# Patient Record
(demographics unavailable — no encounter records)

---

## 2019-12-12 NOTE — RAD
3 view study of the left hand

 

Clinical indications: Left hand pain. Injury last night. Pain in the third

and fifth digits.

 

FINDINGS: No acute fracture or dislocation or lytic process is evident. No

erosive arthritic change is evident. No radiopaque foreign body is 

evident.

 

IMPRESSION: No acute osseous abnormality.

 

Electronically signed by: David Dai MD (12/12/2019 6:13 PM) 

[id number]

## 2020-11-19 NOTE — RAD
Examination: CT CHEST ABD PELVIS W/CONTRAST

 

History:  left rib pain/luq abd pain, s/p fall  

 

Comparison/Correlation: None

 

Findings: Axial images of the chest, abdomen, and pelvis were obtained 

following IV contrast. Sagittal and coronal reformatted images were 

provided.

 

Axial images of the chest, abdomen, and pelvis were obtained following IV 

contrast. Sagittal and coronal reformatted images were provided.

 

Minimal bullous involvement of the lung apices noted. No infiltrate. No 

suspicious pulmonary nodule or mass. No pneumothorax. No enlarged nodes. 

Thoracic aorta is unremarkable. Bony thorax is unremarkable.

 

Liver, spleen, pancreas, adrenal glands, and kidneys are normal. The 

gallbladder fossa is unremarkable. Appendix is normal. No bowel 

obstruction or inflammatory findings. No extraluminal gas. Urinary bladder

is unremarkable.

 

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 

developmental. No acute bony process.

 

 

Impression:

No rib fracture or abdominal organ laceration.

 

No infiltrate.

 

 

PQRS Compliance Statement:

 

One or more of the following individualized dose reduction techniques were

utilized for this examination:  

1. Automated exposure control  

2. Adjustment of the mA and/or kV according to patient size  

3. Use of iterative reconstruction technique

 

Electronically signed by: Rashel Joyner MD (11/19/2020 4:44 PM) [facility]

## 2020-11-19 NOTE — PHYS DOC
Past History


Past Medical History:  Fibromyalgia, Hypertension, Other


Past Surgical History:  Other


Alcohol Use:  Heavy


Drug Use:  None





General Adult


EDM:


Chief Complaint:  RIB PAIN





HPI:


HPI:


40 yo M PMH HTN and fibromyalgia presents to the ed with c/o left lower anterior

and posterior rib pain after accidental slip/fall on wet bathroom floor, fell 

backwards and hit his back/landed on the shower tub. No head injury or LOC. On 

no AC. Was not intoxicated at the time of injury. Pain worsens with deep 

respirations.





Review of Systems:


Review of Systems:


Constitutional:  Denies fever or chills 


Eyes:  Denies change in visual acuity 


HENT:  Denies nasal congestion or sore throat 


Respiratory:  Denies cough or hemoptysis


Cardiovascular:  Denies chest pain or edema 


GI:  Denies nausea, vomiting, bloody stools or diarrhea 


: Denies dysuria 


Musculoskeletal:  Denies joint pain or swelling


Integument:  Denies rash 


Neurologic:  Denies headache, midline neck pain, focal weakness or sensory 

changes 


Endocrine:  Denies polyuria or polydipsia 


Lymphatic:  Denies swollen glands 


Psychiatric:  Denies depression or anxiety





Current Medications:


Current Meds:





Current Medications








 Medications


  (Trade)  Dose


 Ordered  Sig/Jamie  Start Time


 Stop Time Status Last Admin


Dose Admin


 


 Hydromorphone HCl


  (Dilaudid)  0.5 mg  1X  ONCE  20 15:15


 20 15:16   





 


 Info


  (Do NOT chart on


 this entry -- for


 MONITORING)  1 each  PRN DAILY  PRN  20 14:45


 20 14:44   





 


 Iohexol


  (Omnipaque 300


 Mg/ml)  75 ml  1X  ONCE  20 14:15


 20 14:30 DC  





 


 Lidocaine


  (Lidoderm)  1 patch  DAILY  20 14:30


     





 


 Miscellaneous


  (Lidoderm Patch


 Removal)  1 ea  QHS  20 21:00


     














Allergies:


Allergies:





Allergies








Coded Allergies Type Severity Reaction Last Updated Verified


 


  codeine Allergy Intermediate  20 Yes


 


  prochlorperazine Allergy Intermediate  20 Yes











Physical Exam:


PE:





Constitutional: ambulates to ed room but uncomfortable in stretcher, c/o severe 

pain with very light touch-repeat exams not consistent with same location of 

pain each time


HENT: Normocephalic, atraumatic, no midline neck pain


Eyes: PERRLA, EOMI, conjunctiva normal, no discharge. [] 


Neck: Normal range of motion, no tenderness, supple, no stridor. [] 


Cardiovascular:Heart rate regular rhythm, no murmur []


Lungs & Thorax:  Bilateral breath sounds clear to auscultation [] left anterior 

posterior rib tenderness with no flail chest or crepitus


Abdomen: Bowel sounds normal, soft, pain over left lumbar back and left upper 

quadrant (later with no abdominal or back pain)


Skin: Warm, dry, no erythema, no rash. [] 


Back: No midline tenderness, no saddle anesthesia


Extremities: No tenderness, no cyanosis, no clubbing, ROM intact, no edema. [] 


Neurologic: Alert and oriented X 3, normal motor function, normal sensory 

function, no focal deficits noted. []


Psychologic: Affect normal, judgement normal, mood normal. []


Nexus C-spine criteria are negative: There is no post midline tenderness, the 

patient is not intoxicated, there is a normal level of alertness, there are no 

focal neurologic deficits and there are no distracting injuries.





Current Patient Data:


Vital Signs:





                                   Vital Signs








  Date Time  Temp Pulse Resp B/P (MAP) Pulse Ox O2 Delivery O2 Flow Rate FiO2


 


20 14:51   24  99   


 


20 13:40 97.2 78  124/64 (84)    











EKG:


EKG:


[]





Radiology/Procedures:


Radiology/Procedures:


                                 IMAGING REPORT





                                     Signed





PATIENT: HAYLEE LUND     ACCOUNT: [id number]     MRN#: [id number]


: 1979           LOCATION: ER              AGE: 41


SEX: M                    EXAM DT: 20         ACCESSION#: [id number]


STATUS: REG ER            ORD. PHYSICIAN: SAVANNAH MARTE DO


REASON: left rib pain/luq abd pain, s/p fall


PROCEDURE: CT CHEST ABD PELVIS W/CONTRAST





Examination: CT CHEST ABD PELVIS W/CONTRAST


 


History:  left rib pain/luq abd pain, s/p fall  


 


Comparison/Correlation: None


 


Findings: Axial images of the chest, abdomen, and pelvis were obtained 


following IV contrast. Sagittal and coronal reformatted images were 


provided.


 


Axial images of the chest, abdomen, and pelvis were obtained following IV 


contrast. Sagittal and coronal reformatted images were provided.


 


Minimal bullous involvement of the lung apices noted. No infiltrate. No 


suspicious pulmonary nodule or mass. No pneumothorax. No enlarged nodes. 


Thoracic aorta is unremarkable. Bony thorax is unremarkable.


 


Liver, spleen, pancreas, adrenal glands, and kidneys are normal. The 


gallbladder fossa is unremarkable. Appendix is normal. No bowel 


obstruction or inflammatory findings. No extraluminal gas. Urinary bladder


is unremarkable.


 


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 


developmental. No acute bony process.


 


 


Impression:


No rib fracture or abdominal organ laceration.


 


No infiltrate.


 


 


PQRS Compliance Statement:


 


One or more of the following individualized dose reduction techniques were


utilized for this examination:  


1. Automated exposure control  


2. Adjustment of the mA and/or kV according to patient size  


3. Use of iterative reconstruction technique


 


Electronically signed by: Rashel Collins MD (2020 4:44 PM) [facility]














DICTATED AND SIGNED BY:     RASHEL COLLINS MD


DATE:     20 0812





CC: [facility]; [facility] ~[facility]TH0 0





Heart Score:


Risk Factors:


Risk Factors:  DM, Current or recent (<one month) smoker, HTN, HLP, family 

history of CAD, obesity.


Risk Scores:


Score 0 - 3:  2.5% MACE over next 6 weeks - Discharge Home


Score 4 - 6:  20.3% MACE over next 6 weeks - Admit for Clinical Observation


Score 7 - 10:  72.7% MACE over next 6 weeks - Early Invasive Strategies





Course & Med Decision Making:


Course & Med Decision Making


Pertinent Labs and Imaging studies reviewed. (See chart for details)





Concern for musculoskeletal injury, likely spasms.   Patient reports demerol is 

only medication that helps for his pain (after 1mg dilaudid), morphine is not 

enough (when he had surgery on his right hand which has no scars).  Denies being

 on any outpatient narcotic medications. CT imaging negative for any traumatic 

injury. Normal aorta diameter on CT imaging. Will DC home with muscle relaxers 

and Lidoderm patch.  Strict ED return precautions were given for neurologic 

deficits, saddle anesthesia, syncope or chest pain. Encouraged urgent outpatient

 follow-up with PMD.  Life-threatening processes were considered but are low 

suspicion at this time, given history and physical exam. Pt was educated on all 

prescription medications and adverse effects.  All patient's questions were 

answered and pt was stable at time of discharge.





Life/limb-threatening differential includes but is not limited to, intracranial 

hemorrhage, diffuse axonal injury, spinal cord syndrome, unstable cervical 

fracture or SCIWORA, fractures or joint dislocations, neurovascular injuries, 

organ injury or laceration, pneumothorax, pneumoperitoneum, pericardial 

tamponade, unstable pelvic fracture, compartment syndrome, flail chest or 

respiratory distress, burn injury or asphyxiation





I spoken with the patient and her caregivers.  I explained the patient's 

condition, diagnoses and treatment plan based on the information available to me

 at this time.  I have answered the patient and her caregiver's questions and 

addressed any concerns.  The patient and her caregivers have a good 

understanding of patient's diagnosis, condition and treatment plan as can be 

expected at this point.  Vital signs have been stable.  Patient's condition is 

stable and appropriate for discharge from the emergency department. 





Patient will pursue further outpatient evaluation with primary care physician or

 other designated or consulting physician as outlined in the discharge 

instructions.  The patient and/or caregivers are agreeable to this plan of care 

and follow-up instructions have been explained in detail.  The patient and/or 

caregivers have received these instructions in written form and have expressed 

an understanding of the discharge instructions.  The patient and/or caregivers 

are aware that any significant change of condition or worsening of symptoms 

should prompt immediate return to this or the closest emergency department or 

call to [phone].





Maryam Disclaimer:


Dragon Disclaimer:


This electronic medical record was generated, in whole or in part, using a voice

 recognition dictation system.





Departure


Departure:


Impression:  


   Primary Impression:  


   Rib pain on left side


   Additional Impressions:  


   Contusion


   Muscle spasm of back


Disposition:  01 DC HOME SELF CARE/HOMELESS


Condition:  STABLE


Referrals:  


IRAM AL (PCP)


Patient Instructions:  Muscle Strain, Rib Contusion





Additional Instructions:  


EMERGENCY DEPARTMENT GENERAL DISCHARGE INSTRUCTIONS





Thank you for coming to [facility] (ED) today and trusting us

 with you 


care.  We trust that you had a positivie experience in our Emergency Department.

  If you 


wish to speak to the department management, you may call the director at 

[phone].





YOUR FOLLOW UP INSTRUCTIONS ARE AS FOLLOWS:





1.  Do you have a private Doctor?  If you do not have a private doctor, please 

ask for a 


resource list of physicians or clinics that may be able to assist you with 

follow up care.





2.  The Emergency Physician has interpreted your x-rays.  The X-Ray specialist 

will also 


review them.  If there is a change in the findings, you will be notified in 48 

hours when at 


all possible.





3.  A lab test or culture has been done, your results will be reviewed and you 

will be 


notified if you need a change in treatment.





ADDITIONAL INSTRUCTIONS AND INFORMATION:





1.  Your care today has been supervised by a physician who is specially trained 

in emergency 


care.  Many problems require more than one evaluation for a complete diagnosis 

and 


treatment.  We recommend that you schedule your follow up appointment as 

recommended to 


ensure complete treatment of you illness or injury.  If you are unable to obtain

 follow up 


care and continue to have a problem, or if your condition worsens, we recommend 

that you 


return to the ED.





2.  We are not able to safely determine your condition over the phone nor are we

 able to 


give sound medical advice over the phone.  For these safety reasons, if you call

 for medical 


advice we will ask you to come to the ED for further evaluation.





3.  If you have any questions regarding these discharge instructions please call

 the ED at 


[phone].





SAFETY INFORMATION:





In the interest of safety, wellness, and injury prevention; we encourage you to 

wear your 


sealbelt, if you smoke; quite smoking, and we encourage family to use a 

protective helmet 


for bicycling and other sporting events that present an increased risk for head 

injury.





IF YOUR SYMPTOMS WORSEN OR NEW SYMPTOMS DEVELOP, OR YOU HAVE CONCERNS ABOUT YOUR

 CONDITION; 


OR IF YOUR CONDITION WORSENS WHILE YOU ARE WAITING FOR YOUR FOLLOW UP 

APPOINTMENT; EITHER 


CONTACT YOUR PRIMARY CARE DOCTOR, THE PHYSICIAN WHOSE NAME AND NUMBER YOU WERE 

GIVEN, OR 


RETURN TO THE ED IMMEDIATELY.


Scripts


Ibuprofen (IBUPROFEN) 600 Mg Tablet


600 MG PO Q6HRS for headache, #20 TAB


   Prov: SAVANNAH MARTE DO         20 


Cyclobenzaprine Hcl (CYCLOBENZAPRINE HCL) 10 Mg Tablet


1 TAB PO TID PRN for MUSCLE SPASMS, #15 TAB


   Prov: SAVANNAH MARTE DO         20











SAVANNAH MARTE DO               2020 15:12